# Patient Record
Sex: FEMALE | URBAN - METROPOLITAN AREA
[De-identification: names, ages, dates, MRNs, and addresses within clinical notes are randomized per-mention and may not be internally consistent; named-entity substitution may affect disease eponyms.]

---
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
## 2024-03-04 ENCOUNTER — APPOINTMENT (RX ONLY)
Dept: URBAN - METROPOLITAN AREA CLINIC 23 | Facility: CLINIC | Age: 19
Setting detail: DERMATOLOGY
End: 2024-03-04

## 2024-03-04 DIAGNOSIS — L91.0 HYPERTROPHIC SCAR: ICD-10-CM

## 2024-03-04 PROCEDURE — ? INTRALESIONAL KENALOG

## 2024-03-04 PROCEDURE — 11900 INJECT SKIN LESIONS </W 7: CPT

## 2024-03-04 PROCEDURE — ? COUNSELING

## 2024-03-04 ASSESSMENT — LOCATION ZONE DERM: LOCATION ZONE: EAR

## 2024-03-04 ASSESSMENT — LOCATION DETAILED DESCRIPTION DERM: LOCATION DETAILED: LEFT SUPERIOR POSTERIOR HELIX

## 2024-03-04 ASSESSMENT — LOCATION SIMPLE DESCRIPTION DERM: LOCATION SIMPLE: LEFT EAR

## 2024-03-04 NOTE — PROCEDURE: INTRALESIONAL KENALOG
Total Volume (Ccs): 0.1
Consent: The risks of atrophy were reviewed with the patient.
X Size Of Lesion In Cm (Optional): 0
Include Z78.9 (Other Specified Conditions Influencing Health Status) As An Associated Diagnosis?: No
Detail Level: Simple
Which Kenalog Vial Was Used?: Kenalog 10 mg/ml (5 ml vial)
Administered By (Optional): Nel Patterson PA-C
Concentration Of Kenalog Solution Injected (Mg/Ml): 10.0
Lot # For Kenalog (Optional): [id number]
Medical Necessity Clause: This procedure was medically necessary because the lesions that were treated were:
Kenalog Type Of Vial: Multiple Dose
Expiration Date For Kenalog (Optional): NOV 2025
Validate Note Data When Using Inventory: Yes
Kenalog Preparation: Kenalog

## 2024-03-04 NOTE — HPI: SKIN LESION
What Type Of Note Output Would You Prefer (Optional)?: Standard Output
How Severe Is Your Skin Lesion?: mild
Is This A New Presentation, Or A Follow-Up?: Skin Lesion
Additional History: Became a bump, scabbed, then drained pus. It is where her piercing is.

## 2024-05-21 ENCOUNTER — APPOINTMENT (RX ONLY)
Dept: URBAN - METROPOLITAN AREA CLINIC 23 | Facility: CLINIC | Age: 19
Setting detail: DERMATOLOGY
End: 2024-05-21

## 2024-05-21 DIAGNOSIS — L91.0 HYPERTROPHIC SCAR: ICD-10-CM | Status: IMPROVED

## 2024-05-21 PROCEDURE — 11900 INJECT SKIN LESIONS </W 7: CPT

## 2024-05-21 PROCEDURE — ? COUNSELING

## 2024-05-21 PROCEDURE — ? INTRALESIONAL KENALOG

## 2024-05-21 ASSESSMENT — LOCATION ZONE DERM: LOCATION ZONE: EAR

## 2024-05-21 ASSESSMENT — LOCATION DETAILED DESCRIPTION DERM: LOCATION DETAILED: LEFT SUPERIOR POSTERIOR HELIX

## 2024-05-21 ASSESSMENT — LOCATION SIMPLE DESCRIPTION DERM: LOCATION SIMPLE: LEFT EAR

## 2024-05-21 NOTE — PROCEDURE: INTRALESIONAL KENALOG
Total Volume (Ccs): 0.05
Consent: The risks of atrophy were reviewed with the patient.
Ndc# For Kenalog Only: 1079-2692-83
X Size Of Lesion In Cm (Optional): 0
Include Z78.9 (Other Specified Conditions Influencing Health Status) As An Associated Diagnosis?: No
Detail Level: Simple
Which Kenalog Vial Was Used?: Kenalog 10 mg/ml (5 ml vial)
Administered By (Optional): Lali Perez NP
Concentration Of Kenalog Solution Injected (Mg/Ml): 10.0
Lot # For Kenalog (Optional): [id number]
Medical Necessity Clause: This procedure was medically necessary because the lesions that were treated were:
Kenalog Type Of Vial: Multiple Dose
Expiration Date For Kenalog (Optional): JAN 2026
Validate Note Data When Using Inventory: Yes
Kenalog Preparation: Kenalog

## 2024-07-08 NOTE — PROCEDURE: MIPS QUALITY
----- Message from MARY Fisher sent at 7/8/2024  9:00 AM CDT -----  Please see if pain improved with the oral steroids?  
Quality 226: Preventive Care And Screening: Tobacco Use: Screening And Cessation Intervention: Patient screened for tobacco use and is an ex/non-smoker
Quality 130: Documentation Of Current Medications In The Medical Record: Current Medications Documented
Detail Level: Detailed
Quality 431: Preventive Care And Screening: Unhealthy Alcohol Use - Screening: Patient not identified as an unhealthy alcohol user when screened for unhealthy alcohol use using a systematic screening method